# Patient Record
Sex: MALE | Race: WHITE | NOT HISPANIC OR LATINO | Employment: UNEMPLOYED | URBAN - NONMETROPOLITAN AREA
[De-identification: names, ages, dates, MRNs, and addresses within clinical notes are randomized per-mention and may not be internally consistent; named-entity substitution may affect disease eponyms.]

---

## 2023-10-20 ENCOUNTER — APPOINTMENT (OUTPATIENT)
Dept: RADIOLOGY | Facility: HOSPITAL | Age: 63
DRG: 124 | End: 2023-10-20
Payer: MEDICAID

## 2023-10-20 ENCOUNTER — HOSPITAL ENCOUNTER (OUTPATIENT)
Facility: HOSPITAL | Age: 63
Setting detail: OBSERVATION
Discharge: HOME | DRG: 124 | End: 2023-10-23
Attending: EMERGENCY MEDICINE | Admitting: INTERNAL MEDICINE
Payer: MEDICAID

## 2023-10-20 DIAGNOSIS — S09.90XA CLOSED HEAD INJURY, INITIAL ENCOUNTER: ICD-10-CM
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
## 2023-10-20 DIAGNOSIS — S01.81XA FACE LACERATIONS, INITIAL ENCOUNTER: ICD-10-CM

## 2023-10-20 DIAGNOSIS — R29.6 MULTIPLE FALLS: ICD-10-CM

## 2023-10-20 LAB
ANION GAP SERPL CALC-SCNC: 11 MMOL/L (ref 10–20)
APPEARANCE UR: CLEAR
BASOPHILS # BLD AUTO: 0.04 X10*3/UL (ref 0–0.1)
BASOPHILS NFR BLD AUTO: 0.7 %
BILIRUB UR STRIP.AUTO-MCNC: NEGATIVE MG/DL
BUN SERPL-MCNC: 14 MG/DL (ref 6–23)
CALCIUM SERPL-MCNC: 9.6 MG/DL (ref 8.6–10.3)
CHLORIDE SERPL-SCNC: 102 MMOL/L (ref 98–107)
CO2 SERPL-SCNC: 27 MMOL/L (ref 21–32)
COLOR UR: YELLOW
CREAT SERPL-MCNC: 0.58 MG/DL (ref 0.5–1.3)
EOSINOPHIL # BLD AUTO: 0.11 X10*3/UL (ref 0–0.7)
EOSINOPHIL NFR BLD AUTO: 1.9 %
ERYTHROCYTE [DISTWIDTH] IN BLOOD BY AUTOMATED COUNT: 13.2 % (ref 11.5–14.5)
ETHANOL SERPL-MCNC: <10 MG/DL
GFR SERPL CREATININE-BSD FRML MDRD: >90 ML/MIN/1.73M*2
GLUCOSE BLD MANUAL STRIP-MCNC: 105 MG/DL (ref 74–99)
GLUCOSE BLD MANUAL STRIP-MCNC: 170 MG/DL (ref 74–99)
GLUCOSE SERPL-MCNC: 157 MG/DL (ref 74–99)
GLUCOSE UR STRIP.AUTO-MCNC: ABNORMAL MG/DL
HCT VFR BLD AUTO: 44.8 % (ref 41–52)
HGB BLD-MCNC: 15.2 G/DL (ref 13.5–17.5)
HOLD SPECIMEN: NORMAL
IMM GRANULOCYTES # BLD AUTO: 0.02 X10*3/UL (ref 0–0.7)
IMM GRANULOCYTES NFR BLD AUTO: 0.3 % (ref 0–0.9)
KETONES UR STRIP.AUTO-MCNC: ABNORMAL MG/DL
LEUKOCYTE ESTERASE UR QL STRIP.AUTO: NEGATIVE
LYMPHOCYTES # BLD AUTO: 1.12 X10*3/UL (ref 1.2–4.8)
LYMPHOCYTES NFR BLD AUTO: 19.5 %
MCH RBC QN AUTO: 30.4 PG (ref 26–34)
MCHC RBC AUTO-ENTMCNC: 33.9 G/DL (ref 32–36)
MCV RBC AUTO: 90 FL (ref 80–100)
MONOCYTES # BLD AUTO: 0.36 X10*3/UL (ref 0.1–1)
MONOCYTES NFR BLD AUTO: 6.3 %
NEUTROPHILS # BLD AUTO: 4.1 X10*3/UL (ref 1.2–7.7)
NEUTROPHILS NFR BLD AUTO: 71.3 %
NITRITE UR QL STRIP.AUTO: NEGATIVE
NRBC BLD-RTO: 0 /100 WBCS (ref 0–0)
PH UR STRIP.AUTO: 5 [PH]
PLATELET # BLD AUTO: 196 X10*3/UL (ref 150–450)
PMV BLD AUTO: 8.3 FL (ref 7.5–11.5)
POTASSIUM SERPL-SCNC: 3.8 MMOL/L (ref 3.5–5.3)
PROT UR STRIP.AUTO-MCNC: NEGATIVE MG/DL
RBC # BLD AUTO: 5 X10*6/UL (ref 4.5–5.9)
RBC # UR STRIP.AUTO: NEGATIVE /UL
SODIUM SERPL-SCNC: 136 MMOL/L (ref 136–145)
SP GR UR STRIP.AUTO: 1.03
UROBILINOGEN UR STRIP.AUTO-MCNC: <2 MG/DL
WBC # BLD AUTO: 5.8 X10*3/UL (ref 4.4–11.3)

## 2023-10-20 PROCEDURE — 70486 CT MAXILLOFACIAL W/O DYE: CPT | Performed by: RADIOLOGY

## 2023-10-20 PROCEDURE — 96372 THER/PROPH/DIAG INJ SC/IM: CPT | Mod: IPSPLIT | Performed by: INTERNAL MEDICINE

## 2023-10-20 PROCEDURE — 72125 CT NECK SPINE W/O DYE: CPT | Mod: MG

## 2023-10-20 PROCEDURE — 70450 CT HEAD/BRAIN W/O DYE: CPT | Performed by: RADIOLOGY

## 2023-10-20 PROCEDURE — 73564 X-RAY EXAM KNEE 4 OR MORE: CPT | Mod: LT,IPSPLIT

## 2023-10-20 PROCEDURE — 73564 X-RAY EXAM KNEE 4 OR MORE: CPT | Mod: LEFT SIDE | Performed by: RADIOLOGY

## 2023-10-20 PROCEDURE — 96360 HYDRATION IV INFUSION INIT: CPT

## 2023-10-20 PROCEDURE — 73110 X-RAY EXAM OF WRIST: CPT | Mod: RT,FY

## 2023-10-20 PROCEDURE — G0378 HOSPITAL OBSERVATION PER HR: HCPCS

## 2023-10-20 PROCEDURE — 80048 BASIC METABOLIC PNL TOTAL CA: CPT | Performed by: EMERGENCY MEDICINE

## 2023-10-20 PROCEDURE — 2500000001 HC RX 250 WO HCPCS SELF ADMINISTERED DRUGS (ALT 637 FOR MEDICARE OP): Mod: IPSPLIT | Performed by: INTERNAL MEDICINE

## 2023-10-20 PROCEDURE — 70450 CT HEAD/BRAIN W/O DYE: CPT | Mod: MG

## 2023-10-20 PROCEDURE — 1210000001 HC SEMI-PRIVATE ROOM DAILY: Mod: IPSPLIT

## 2023-10-20 PROCEDURE — 76376 3D RENDER W/INTRP POSTPROCES: CPT | Performed by: RADIOLOGY

## 2023-10-20 PROCEDURE — 82947 ASSAY GLUCOSE BLOOD QUANT: CPT | Mod: 59,IPSPLIT

## 2023-10-20 PROCEDURE — 36415 COLL VENOUS BLD VENIPUNCTURE: CPT | Performed by: EMERGENCY MEDICINE

## 2023-10-20 PROCEDURE — 85025 COMPLETE CBC W/AUTO DIFF WBC: CPT | Performed by: EMERGENCY MEDICINE

## 2023-10-20 PROCEDURE — 2500000004 HC RX 250 GENERAL PHARMACY W/ HCPCS (ALT 636 FOR OP/ED): Mod: 59,IPSPLIT | Performed by: INTERNAL MEDICINE

## 2023-10-20 PROCEDURE — 99285 EMERGENCY DEPT VISIT HI MDM: CPT | Performed by: EMERGENCY MEDICINE

## 2023-10-20 PROCEDURE — 81003 URINALYSIS AUTO W/O SCOPE: CPT | Performed by: EMERGENCY MEDICINE

## 2023-10-20 PROCEDURE — 73110 X-RAY EXAM OF WRIST: CPT | Mod: RIGHT SIDE | Performed by: RADIOLOGY

## 2023-10-20 PROCEDURE — 2500000004 HC RX 250 GENERAL PHARMACY W/ HCPCS (ALT 636 FOR OP/ED): Mod: SE | Performed by: EMERGENCY MEDICINE

## 2023-10-20 PROCEDURE — 1100000001 HC PRIVATE ROOM DAILY: Mod: IPSPLIT

## 2023-10-20 PROCEDURE — 82077 ASSAY SPEC XCP UR&BREATH IA: CPT | Performed by: EMERGENCY MEDICINE

## 2023-10-20 PROCEDURE — 72125 CT NECK SPINE W/O DYE: CPT | Performed by: RADIOLOGY

## 2023-10-20 PROCEDURE — 96361 HYDRATE IV INFUSION ADD-ON: CPT

## 2023-10-20 RX ORDER — BUPROPION HYDROCHLORIDE 300 MG/1
300 TABLET ORAL
COMMUNITY
Start: 2021-06-01

## 2023-10-20 RX ORDER — DEXTROSE 50 % IN WATER (D50W) INTRAVENOUS SYRINGE
25
Status: DISCONTINUED | OUTPATIENT
Start: 2023-10-20 | End: 2023-10-23 | Stop reason: HOSPADM

## 2023-10-20 RX ORDER — BUSPIRONE HYDROCHLORIDE 5 MG/1
15 TABLET ORAL 2 TIMES DAILY
Status: DISCONTINUED | OUTPATIENT
Start: 2023-10-20 | End: 2023-10-23 | Stop reason: HOSPADM

## 2023-10-20 RX ORDER — GABAPENTIN 100 MG/1
200 CAPSULE ORAL EVERY 8 HOURS SCHEDULED
Status: DISCONTINUED | OUTPATIENT
Start: 2023-10-20 | End: 2023-10-23 | Stop reason: HOSPADM

## 2023-10-20 RX ORDER — ENOXAPARIN SODIUM 100 MG/ML
40 INJECTION SUBCUTANEOUS EVERY 24 HOURS
Status: DISCONTINUED | OUTPATIENT
Start: 2023-10-20 | End: 2023-10-23 | Stop reason: HOSPADM

## 2023-10-20 RX ORDER — BUSPIRONE HYDROCHLORIDE 15 MG/1
15 TABLET ORAL
COMMUNITY
Start: 2021-06-01

## 2023-10-20 RX ORDER — BUPROPION HYDROCHLORIDE 150 MG/1
300 TABLET ORAL DAILY
Status: DISCONTINUED | OUTPATIENT
Start: 2023-10-20 | End: 2023-10-23 | Stop reason: HOSPADM

## 2023-10-20 RX ORDER — ATORVASTATIN CALCIUM 40 MG/1
40 TABLET, FILM COATED ORAL DAILY
Status: DISCONTINUED | OUTPATIENT
Start: 2023-10-20 | End: 2023-10-23 | Stop reason: HOSPADM

## 2023-10-20 RX ORDER — DEXTROSE MONOHYDRATE 100 MG/ML
0.3 INJECTION, SOLUTION INTRAVENOUS ONCE AS NEEDED
Status: DISCONTINUED | OUTPATIENT
Start: 2023-10-20 | End: 2023-10-23 | Stop reason: HOSPADM

## 2023-10-20 RX ORDER — INSULIN LISPRO 100 [IU]/ML
0-15 INJECTION, SOLUTION INTRAVENOUS; SUBCUTANEOUS
Status: DISCONTINUED | OUTPATIENT
Start: 2023-10-20 | End: 2023-10-23 | Stop reason: HOSPADM

## 2023-10-20 RX ORDER — MIRTAZAPINE 15 MG/1
15 TABLET, FILM COATED ORAL
COMMUNITY
Start: 2021-05-03

## 2023-10-20 RX ORDER — DESVENLAFAXINE 100 MG/1
100 TABLET, EXTENDED RELEASE ORAL
COMMUNITY
Start: 2021-06-01

## 2023-10-20 RX ORDER — BUPROPION HYDROCHLORIDE 150 MG/1
150 TABLET ORAL
COMMUNITY
Start: 2021-06-01

## 2023-10-20 RX ORDER — ATORVASTATIN CALCIUM 40 MG/1
1 TABLET, FILM COATED ORAL DAILY
COMMUNITY
Start: 2023-07-19

## 2023-10-20 RX ORDER — GABAPENTIN 100 MG/1
200 CAPSULE ORAL 2 TIMES DAILY
COMMUNITY
Start: 2023-08-21 | End: 2023-11-19

## 2023-10-20 RX ORDER — LIDOCAINE HYDROCHLORIDE AND EPINEPHRINE 10; 10 MG/ML; UG/ML
INJECTION, SOLUTION INFILTRATION; PERINEURAL
Status: DISPENSED
Start: 2023-10-20 | End: 2023-10-21

## 2023-10-20 RX ORDER — MIRTAZAPINE 15 MG/1
15 TABLET, FILM COATED ORAL NIGHTLY
Status: DISCONTINUED | OUTPATIENT
Start: 2023-10-20 | End: 2023-10-23 | Stop reason: HOSPADM

## 2023-10-20 RX ADMIN — BUPROPION HYDROCHLORIDE 300 MG: 150 TABLET, FILM COATED, EXTENDED RELEASE ORAL at 21:39

## 2023-10-20 RX ADMIN — GABAPENTIN 200 MG: 100 CAPSULE ORAL at 21:38

## 2023-10-20 RX ADMIN — MIRTAZAPINE 15 MG: 15 TABLET, FILM COATED ORAL at 21:38

## 2023-10-20 RX ADMIN — BUSPIRONE HYDROCHLORIDE 15 MG: 5 TABLET ORAL at 21:40

## 2023-10-20 RX ADMIN — SODIUM CHLORIDE, POTASSIUM CHLORIDE, SODIUM LACTATE AND CALCIUM CHLORIDE 500 ML: 600; 310; 30; 20 INJECTION, SOLUTION INTRAVENOUS at 14:37

## 2023-10-20 RX ADMIN — ATORVASTATIN CALCIUM 40 MG: 40 TABLET, FILM COATED ORAL at 21:38

## 2023-10-20 RX ADMIN — ENOXAPARIN SODIUM 40 MG: 100 INJECTION SUBCUTANEOUS at 21:39

## 2023-10-20 SDOH — ECONOMIC STABILITY: INCOME INSECURITY: IN THE LAST 12 MONTHS, WAS THERE A TIME WHEN YOU WERE NOT ABLE TO PAY THE MORTGAGE OR RENT ON TIME?: NO

## 2023-10-20 SDOH — SOCIAL STABILITY: SOCIAL INSECURITY: ARE THERE ANY APPARENT SIGNS OF INJURIES/BEHAVIORS THAT COULD BE RELATED TO ABUSE/NEGLECT?: NO

## 2023-10-20 SDOH — HEALTH STABILITY: MENTAL HEALTH: HOW OFTEN DO YOU HAVE 6 OR MORE DRINKS ON ONE OCCASION?: NEVER

## 2023-10-20 SDOH — HEALTH STABILITY: MENTAL HEALTH: HOW OFTEN DO YOU HAVE A DRINK CONTAINING ALCOHOL?: NEVER

## 2023-10-20 SDOH — ECONOMIC STABILITY: FOOD INSECURITY: WITHIN THE PAST 12 MONTHS, THE FOOD YOU BOUGHT JUST DIDN'T LAST AND YOU DIDN'T HAVE MONEY TO GET MORE.: OFTEN TRUE

## 2023-10-20 SDOH — HEALTH STABILITY: MENTAL HEALTH
STRESS IS WHEN SOMEONE FEELS TENSE, NERVOUS, ANXIOUS, OR CAN'T SLEEP AT NIGHT BECAUSE THEIR MIND IS TROUBLED. HOW STRESSED ARE YOU?: RATHER MUCH

## 2023-10-20 SDOH — ECONOMIC STABILITY: INCOME INSECURITY: IN THE PAST 12 MONTHS, HAS THE ELECTRIC, GAS, OIL, OR WATER COMPANY THREATENED TO SHUT OFF SERVICE IN YOUR HOME?: NO

## 2023-10-20 SDOH — ECONOMIC STABILITY: HOUSING INSECURITY
IN THE LAST 12 MONTHS, WAS THERE A TIME WHEN YOU DID NOT HAVE A STEADY PLACE TO SLEEP OR SLEPT IN A SHELTER (INCLUDING NOW)?: YES

## 2023-10-20 SDOH — SOCIAL STABILITY: SOCIAL INSECURITY: HAVE YOU HAD THOUGHTS OF HARMING ANYONE ELSE?: NO

## 2023-10-20 SDOH — SOCIAL STABILITY: SOCIAL INSECURITY: HAS ANYONE EVER THREATENED TO HURT YOUR FAMILY OR YOUR PETS?: NO

## 2023-10-20 SDOH — ECONOMIC STABILITY: TRANSPORTATION INSECURITY
IN THE PAST 12 MONTHS, HAS THE LACK OF TRANSPORTATION KEPT YOU FROM MEDICAL APPOINTMENTS OR FROM GETTING MEDICATIONS?: YES

## 2023-10-20 SDOH — SOCIAL STABILITY: SOCIAL INSECURITY: ABUSE: ADULT

## 2023-10-20 SDOH — ECONOMIC STABILITY: INCOME INSECURITY: HOW HARD IS IT FOR YOU TO PAY FOR THE VERY BASICS LIKE FOOD, HOUSING, MEDICAL CARE, AND HEATING?: VERY HARD

## 2023-10-20 SDOH — SOCIAL STABILITY: SOCIAL INSECURITY: DO YOU FEEL ANYONE HAS EXPLOITED OR TAKEN ADVANTAGE OF YOU FINANCIALLY OR OF YOUR PERSONAL PROPERTY?: NO

## 2023-10-20 SDOH — SOCIAL STABILITY: SOCIAL INSECURITY: DO YOU FEEL UNSAFE GOING BACK TO THE PLACE WHERE YOU ARE LIVING?: NO

## 2023-10-20 SDOH — ECONOMIC STABILITY: TRANSPORTATION INSECURITY
IN THE PAST 12 MONTHS, HAS LACK OF TRANSPORTATION KEPT YOU FROM MEETINGS, WORK, OR FROM GETTING THINGS NEEDED FOR DAILY LIVING?: YES

## 2023-10-20 SDOH — SOCIAL STABILITY: SOCIAL INSECURITY: ARE YOU OR HAVE YOU BEEN THREATENED OR ABUSED PHYSICALLY, EMOTIONALLY, OR SEXUALLY BY ANYONE?: NO

## 2023-10-20 SDOH — ECONOMIC STABILITY: FOOD INSECURITY: WITHIN THE PAST 12 MONTHS, YOU WORRIED THAT YOUR FOOD WOULD RUN OUT BEFORE YOU GOT MONEY TO BUY MORE.: OFTEN TRUE

## 2023-10-20 SDOH — SOCIAL STABILITY: SOCIAL INSECURITY: WERE YOU ABLE TO COMPLETE ALL THE BEHAVIORAL HEALTH SCREENINGS?: YES

## 2023-10-20 SDOH — HEALTH STABILITY: MENTAL HEALTH: HOW MANY STANDARD DRINKS CONTAINING ALCOHOL DO YOU HAVE ON A TYPICAL DAY?: PATIENT DOES NOT DRINK

## 2023-10-20 SDOH — ECONOMIC STABILITY: HOUSING INSECURITY: IN THE LAST 12 MONTHS, HOW MANY PLACES HAVE YOU LIVED?: 2

## 2023-10-20 SDOH — SOCIAL STABILITY: SOCIAL INSECURITY: DOES ANYONE TRY TO KEEP YOU FROM HAVING/CONTACTING OTHER FRIENDS OR DOING THINGS OUTSIDE YOUR HOME?: NO

## 2023-10-20 ASSESSMENT — COGNITIVE AND FUNCTIONAL STATUS - GENERAL
DAILY ACTIVITIY SCORE: 24
MOBILITY SCORE: 24
PATIENT BASELINE BEDBOUND: NO
MOBILITY SCORE: 24

## 2023-10-20 ASSESSMENT — LIFESTYLE VARIABLES
AUDIT-C TOTAL SCORE: 0
PRESCIPTION_ABUSE_PAST_12_MONTHS: NO
SKIP TO QUESTIONS 9-10: 1
AUDIT-C TOTAL SCORE: 0
HOW MANY STANDARD DRINKS CONTAINING ALCOHOL DO YOU HAVE ON A TYPICAL DAY: PATIENT DOES NOT DRINK
SKIP TO QUESTIONS 9-10: 1
AUDIT-C TOTAL SCORE: 0
HOW OFTEN DO YOU HAVE A DRINK CONTAINING ALCOHOL: NEVER
HOW OFTEN DO YOU HAVE 6 OR MORE DRINKS ON ONE OCCASION: NEVER
SUBSTANCE_ABUSE_PAST_12_MONTHS: NO

## 2023-10-20 ASSESSMENT — COLUMBIA-SUICIDE SEVERITY RATING SCALE - C-SSRS
6. HAVE YOU EVER DONE ANYTHING, STARTED TO DO ANYTHING, OR PREPARED TO DO ANYTHING TO END YOUR LIFE?: NO
2. HAVE YOU ACTUALLY HAD ANY THOUGHTS OF KILLING YOURSELF?: NO
1. IN THE PAST MONTH, HAVE YOU WISHED YOU WERE DEAD OR WISHED YOU COULD GO TO SLEEP AND NOT WAKE UP?: NO

## 2023-10-20 ASSESSMENT — ACTIVITIES OF DAILY LIVING (ADL)
LACK_OF_TRANSPORTATION: YES
ADEQUATE_TO_COMPLETE_ADL: YES
HEARING - LEFT EAR: FUNCTIONAL
PATIENT'S MEMORY ADEQUATE TO SAFELY COMPLETE DAILY ACTIVITIES?: YES
WALKS IN HOME: INDEPENDENT
FEEDING YOURSELF: INDEPENDENT
BATHING: INDEPENDENT
GROOMING: INDEPENDENT
HEARING - RIGHT EAR: FUNCTIONAL
DRESSING YOURSELF: INDEPENDENT
LACK_OF_TRANSPORTATION: YES
TOILETING: INDEPENDENT
JUDGMENT_ADEQUATE_SAFELY_COMPLETE_DAILY_ACTIVITIES: YES

## 2023-10-20 ASSESSMENT — PAIN SCALES - GENERAL
PAINLEVEL_OUTOF10: 10 - WORST POSSIBLE PAIN
PAINLEVEL_OUTOF10: 0 - NO PAIN
PAINLEVEL_OUTOF10: 4

## 2023-10-20 ASSESSMENT — PATIENT HEALTH QUESTIONNAIRE - PHQ9
1. LITTLE INTEREST OR PLEASURE IN DOING THINGS: SEVERAL DAYS
SUM OF ALL RESPONSES TO PHQ9 QUESTIONS 1 & 2: 2
2. FEELING DOWN, DEPRESSED OR HOPELESS: SEVERAL DAYS

## 2023-10-20 ASSESSMENT — PAIN DESCRIPTION - ORIENTATION: ORIENTATION: LEFT

## 2023-10-20 ASSESSMENT — PAIN - FUNCTIONAL ASSESSMENT: PAIN_FUNCTIONAL_ASSESSMENT: 0-10

## 2023-10-20 ASSESSMENT — PAIN DESCRIPTION - LOCATION: LOCATION: HEAD

## 2023-10-20 NOTE — ED PROVIDER NOTES
HPI   Chief Complaint   Patient presents with    Fall     Walking across street and caught feet on lip in concrete. Has laceration above rt eye, pain in lt knee and rt wrist.       HPI                    Radha Coma Scale Score: 15                  Patient History   History reviewed. No pertinent past medical history.  History reviewed. No pertinent surgical history.  No family history on file.  Social History     Tobacco Use    Smoking status: Former     Types: Cigarettes    Smokeless tobacco: Former   Substance Use Topics    Alcohol use: Never    Drug use: Never       Physical Exam   ED Triage Vitals [10/20/23 1215]   Temp Heart Rate Resp BP   -- 90 18 (!) 156/112      SpO2 Temp src Heart Rate Source Patient Position   -- -- -- --      BP Location FiO2 (%)     -- --       Physical Exam  Vitals and nursing note reviewed.   Constitutional:       General: He is not in acute distress.     Appearance: Normal appearance. He is not toxic-appearing.   HENT:      Head: Normocephalic and atraumatic.        Right Ear: Tympanic membrane normal.      Left Ear: Tympanic membrane normal.      Mouth/Throat:      Mouth: Mucous membranes are moist.      Pharynx: Oropharynx is clear.   Eyes:      Conjunctiva/sclera: Conjunctivae normal.      Pupils: Pupils are equal, round, and reactive to light.   Cardiovascular:      Rate and Rhythm: Normal rate and regular rhythm.      Pulses: Normal pulses.      Heart sounds: Normal heart sounds.   Pulmonary:      Effort: Pulmonary effort is normal. No respiratory distress.      Breath sounds: Normal breath sounds. No wheezing.   Abdominal:      General: Bowel sounds are normal.      Palpations: Abdomen is soft.      Tenderness: There is no abdominal tenderness. There is no guarding or rebound.   Musculoskeletal:         General: Normal range of motion.      Cervical back: Normal range of motion.   Skin:     General: Skin is warm and dry.   Neurological:      General: No focal deficit present.       Mental Status: He is alert and oriented to person, place, and time.         ED Course & MDM   Diagnoses as of 10/20/23 1559   Fall, initial encounter   Closed head injury, initial encounter   Face lacerations, initial encounter   Multiple falls       Medical Decision Making  63-year-old male presents to the ER with increased falls.  According to patient he has been falling a lot lately secondary to his legs not working well.  Patient reports he does use a cane but still is not enough for him.  Patient did fall today and has a large laceration of his left eyebrow.  Patient does not want it repaired he wanted to heal by secondary intention.  Patient also complains of left knee and right wrist pain.  X-ray shows remote fracture on the right wrist.  Patient reported he has been having pain there for a long time we will place a Velcro splint.  Patient is homeless we consulted  who recommended admitting him for placement.  Discussed with inpatient team patient be admitted to the hospital for evaluation and placement for possible rehab.        Procedure  Procedures     Preston Aparicio, DO  10/20/23 5620

## 2023-10-20 NOTE — CARE PLAN
The patient's goals for the shift include control pain    The clinical goals for the shift include control pain, from laceration and left knee

## 2023-10-21 LAB
GLUCOSE BLD MANUAL STRIP-MCNC: 133 MG/DL (ref 74–99)
GLUCOSE BLD MANUAL STRIP-MCNC: 186 MG/DL (ref 74–99)
GLUCOSE BLD MANUAL STRIP-MCNC: 195 MG/DL (ref 74–99)
GLUCOSE BLD MANUAL STRIP-MCNC: 213 MG/DL (ref 74–99)

## 2023-10-21 PROCEDURE — G0378 HOSPITAL OBSERVATION PER HR: HCPCS

## 2023-10-21 PROCEDURE — 2500000001 HC RX 250 WO HCPCS SELF ADMINISTERED DRUGS (ALT 637 FOR MEDICARE OP): Mod: IPSPLIT | Performed by: INTERNAL MEDICINE

## 2023-10-21 PROCEDURE — 1210000001 HC SEMI-PRIVATE ROOM DAILY: Mod: IPSPLIT

## 2023-10-21 PROCEDURE — 99222 1ST HOSP IP/OBS MODERATE 55: CPT | Performed by: INTERNAL MEDICINE

## 2023-10-21 PROCEDURE — 96372 THER/PROPH/DIAG INJ SC/IM: CPT | Mod: IPSPLIT | Performed by: INTERNAL MEDICINE

## 2023-10-21 PROCEDURE — 2500000002 HC RX 250 W HCPCS SELF ADMINISTERED DRUGS (ALT 637 FOR MEDICARE OP, ALT 636 FOR OP/ED): Mod: IPSPLIT | Performed by: INTERNAL MEDICINE

## 2023-10-21 PROCEDURE — 2500000004 HC RX 250 GENERAL PHARMACY W/ HCPCS (ALT 636 FOR OP/ED): Mod: IPSPLIT | Performed by: INTERNAL MEDICINE

## 2023-10-21 PROCEDURE — 82947 ASSAY GLUCOSE BLOOD QUANT: CPT | Mod: IPSPLIT,91

## 2023-10-21 PROCEDURE — 1100000001 HC PRIVATE ROOM DAILY: Mod: IPSPLIT

## 2023-10-21 RX ORDER — ACETAMINOPHEN 325 MG/1
650 TABLET ORAL EVERY 6 HOURS PRN
Status: DISCONTINUED | OUTPATIENT
Start: 2023-10-21 | End: 2023-10-23 | Stop reason: HOSPADM

## 2023-10-21 RX ADMIN — ACETAMINOPHEN 650 MG: 325 TABLET ORAL at 20:55

## 2023-10-21 RX ADMIN — INSULIN LISPRO 6 UNITS: 100 INJECTION, SOLUTION INTRAVENOUS; SUBCUTANEOUS at 17:13

## 2023-10-21 RX ADMIN — ATORVASTATIN CALCIUM 40 MG: 40 TABLET, FILM COATED ORAL at 09:40

## 2023-10-21 RX ADMIN — GABAPENTIN 200 MG: 100 CAPSULE ORAL at 21:00

## 2023-10-21 RX ADMIN — GABAPENTIN 200 MG: 100 CAPSULE ORAL at 14:17

## 2023-10-21 RX ADMIN — MIRTAZAPINE 15 MG: 15 TABLET, FILM COATED ORAL at 20:56

## 2023-10-21 RX ADMIN — BUSPIRONE HYDROCHLORIDE 15 MG: 5 TABLET ORAL at 09:40

## 2023-10-21 RX ADMIN — BUPROPION HYDROCHLORIDE 300 MG: 150 TABLET, FILM COATED, EXTENDED RELEASE ORAL at 09:40

## 2023-10-21 RX ADMIN — GABAPENTIN 200 MG: 100 CAPSULE ORAL at 05:38

## 2023-10-21 RX ADMIN — EMPAGLIFLOZIN 25 MG: 10 TABLET, FILM COATED ORAL at 09:40

## 2023-10-21 RX ADMIN — ENOXAPARIN SODIUM 40 MG: 100 INJECTION SUBCUTANEOUS at 18:28

## 2023-10-21 RX ADMIN — BUSPIRONE HYDROCHLORIDE 15 MG: 5 TABLET ORAL at 20:58

## 2023-10-21 RX ADMIN — INSULIN LISPRO 3 UNITS: 100 INJECTION, SOLUTION INTRAVENOUS; SUBCUTANEOUS at 12:10

## 2023-10-21 ASSESSMENT — COGNITIVE AND FUNCTIONAL STATUS - GENERAL
MOBILITY SCORE: 24
DAILY ACTIVITIY SCORE: 24
DAILY ACTIVITIY SCORE: 24
MOBILITY SCORE: 24

## 2023-10-21 ASSESSMENT — PAIN SCALES - GENERAL
PAINLEVEL_OUTOF10: 0 - NO PAIN
PAINLEVEL_OUTOF10: 3

## 2023-10-21 ASSESSMENT — PAIN - FUNCTIONAL ASSESSMENT
PAIN_FUNCTIONAL_ASSESSMENT: 0-10
PAIN_FUNCTIONAL_ASSESSMENT: 0-10

## 2023-10-21 NOTE — CARE PLAN
The patient's goals for the shift include control pain    The clinical goals for the shift include control pain, from laceration and left knee    Over the shift, the patient did not make progress toward the following goals. Barriers to progression include ***. Recommendations to address these barriers include ***.    Problem: Pain - Adult  Goal: Verbalizes/displays adequate comfort level or baseline comfort level  Outcome: Not Progressing     Problem: Safety - Adult  Goal: Free from fall injury  Outcome: Not Progressing     Problem: Discharge Planning  Goal: Discharge to home or other facility with appropriate resources  Outcome: Not Progressing     Problem: Fall/Injury  Goal: Not fall by end of shift  Outcome: Not Progressing  Goal: Be free from injury by end of the shift  Outcome: Not Progressing  Goal: Verbalize understanding of personal risk factors for fall in the hospital  Outcome: Not Progressing  Goal: Verbalize understanding of risk factor reduction measures to prevent injury from fall in the home  Outcome: Not Progressing  Goal: Use assistive devices by end of the shift  Outcome: Not Progressing  Goal: Pace activities to prevent fatigue by end of the shift  Outcome: Not Progressing     Problem: Pain  Goal: Takes deep breaths with improved pain control throughout the shift  Outcome: Not Progressing  Goal: Turns in bed with improved pain control throughout the shift  Outcome: Not Progressing  Goal: Walks with improved pain control throughout the shift  Outcome: Not Progressing  Goal: Performs ADL's with improved pain control throughout shift  Outcome: Not Progressing  Goal: Participates in PT with improved pain control throughout the shift  Outcome: Not Progressing  Goal: Free from opioid side effects throughout the shift  Outcome: Not Progressing  Goal: Free from acute confusion related to pain meds throughout the shift  Outcome: Not Progressing

## 2023-10-21 NOTE — H&P
10/21/2023  Vaibhav Dick  : 1960  MRN: [id number]  Primary Care:  Mildred Borjas MD  Old Records:  reviewed  Code Status: Full Code    Informant: the patient and the  medical record. There are no family here.  The informant is reliable.    Chief Complaint: mechanical fall and homelessness.    History of Present Illness:History of Present Illness: Vaibhav Dick is a 63 y.o. male, who presents with a recent mechanical fall after having caught his foot on the lip of a sidewalk, and then falling and hitting his head.  CT scan of head and neck did not show fracture or bleed.  Patient is homeless and walks with a cane.  He states that he falls quite frequently.  His eating is sporadic.  He is admitted for possible placement in NH temporarily.     In the Emergency Department, the patient was afebrile and hemodynamically stable. His diagnostic studies and laboratories are summarized below. I saw the patient shortly after arriving on the nursing unit.     Past Medical History:  History reviewed. No pertinent past medical history.    Past Surgical History:   has no past surgical history on file.        Social History: Vaibhav Dick lives  is homeless Completes all ADLs with assistance.    Social History     Socioeconomic History   • Marital status:      Spouse name: None   • Number of children: None   • Years of education: None   • Highest education level: None   Occupational History   • None   Tobacco Use   • Smoking status: Former     Types: Cigarettes   • Smokeless tobacco: Former   Substance and Sexual Activity   • Alcohol use: Never   • Drug use: Never   • Sexual activity: None   Other Topics Concern   • None   Social History Narrative   • None     Social Determinants of Health     Financial Resource Strain: High Risk (10/20/2023)    Overall Financial Resource Strain (CARDIA)    • Difficulty of Paying Living Expenses: Very hard   Food Insecurity: Food Insecurity Present (10/20/2023)    Hunger Vital Sign     • Worried About Running Out of Food in the Last Year: Often true    • Ran Out of Food in the Last Year: Often true   Transportation Needs: Unmet Transportation Needs (10/20/2023)    PRAPARE - Transportation    • Lack of Transportation (Medical): Yes    • Lack of Transportation (Non-Medical): Yes   Physical Activity: Not on file   Stress: Stress Concern Present (10/20/2023)    [employer] - Occupational Stress Questionnaire    • Feeling of Stress : Rather much   Social Connections: Not on file   Intimate Partner Violence: Not on file   Housing Stability: High Risk (10/20/2023)    Housing Stability Vital Sign    • Unable to Pay for Housing in the Last Year: No    • Number of Places Lived in the Last Year: 2    • Unstable Housing in the Last Year: Yes       Allergies  No Known Allergies    Medications - list of home medications reviewed in electronic chart and confirmed  Medications Prior to Admission   Medication Sig Dispense Refill Last Dose   • atorvastatin (Lipitor) 40 mg tablet Take 1 tablet (40 mg) by mouth once daily.      • buPROPion XL (Wellbutrin XL) 150 mg 24 hr tablet Take 1 tablet (150 mg) by mouth.      • buPROPion XL (Wellbutrin XL) 300 mg 24 hr tablet Take 1 tablet (300 mg) by mouth.      • busPIRone (Buspar) 15 mg tablet Take 1 tablet (15 mg) by mouth.      • desvenlafaxine 100 mg 24 hr tablet Take 1 tablet (100 mg) by mouth.      • empagliflozin (Jardiance) 25 mg Take 1 tablet (25 mg) by mouth once daily with a meal.      • gabapentin (Neurontin) 100 mg capsule Take 2 capsules (200 mg) by mouth twice a day.      • mirtazapine (Remeron) 15 mg tablet Take 1 tablet (15 mg) by mouth.          Hospital Medications:    Current Facility-Administered Medications:   •  atorvastatin (Lipitor) tablet 40 mg, 40 mg, oral, Daily, Dustin Antonio MD, 40 mg at 10/20/23 2138  •  buPROPion XL (Wellbutrin XL) 24 hr tablet 300 mg, 300 mg, oral, Daily, Dustin Antonio MD, 300 mg at 10/20/23  2139  •  busPIRone (Buspar) tablet 15 mg, 15 mg, oral, BID, Dustin Antonio MD, 15 mg at 10/20/23 2140  •  dextrose 10 % in water (D10W) infusion, 0.3 g/kg/hr, intravenous, Once PRN, Dustin Antonio MD  •  dextrose 50 % injection 25 g, 25 g, intravenous, q15 min PRN, Dustin Antonio MD  •  empagliflozin (Jardiance) tablet 25 mg, 25 mg, oral, Daily with breakfast, Dustin Antonio MD  •  enoxaparin (Lovenox) syringe 40 mg, 40 mg, subcutaneous, q24h, Dustin Antonio MD, 40 mg at 10/20/23 2139  •  gabapentin (Neurontin) capsule 200 mg, 200 mg, oral, q8h ZULEMA, Dustin Antonio MD, 200 mg at 10/21/23 0538  •  glucagon (Glucagen) injection 1 mg, 1 mg, intramuscular, q15 min PRN, Dustin Antonio MD  •  insulin lispro (HumaLOG) injection 0-15 Units, 0-15 Units, subcutaneous, TID with meals, Dustin Antonio MD  •  mirtazapine (Remeron) tablet 15 mg, 15 mg, oral, Nightly, Dustin Antonio MD, 15 mg at 10/20/23 2138    ROS: As noted in HPI, the remainder of a 14-system review was completed, and negative or as follows:  Constitutional: No fever, chills, weight loss, night sweats  Eyes: No visual changes, difficulties  Ears, Nose, Mouth, Throat: No Tender gums, dry mouth  Cardiovascular: No palpitations, edema, chest pain  Respiratory: No Wheezing, shortness of breath, orthopnea  Gastrointestinal: No Nausea, abdominal pain, vomiting  Genitourinary: No frequent urination, hematuria  Musculoskeletal: No joint pain, swollen joint, difficulty walking  Integumentary: No skin irritation, blemishes, redness in skin  Neurological: No Loss of consciousness, seizures, numbness  Psychiatric: No anxiety, sadness  Endocrine: No Polydipsia, hot or cold intolerance  Hematologic/Lymphatic: No tender lymph nodes, easy bruising  Allergic/Immunologic: No seasonal allergies with itchy eyes, frequent infections    PHYSICAL EXAM   /72 (BP Location: Right arm, Patient Position: Lying)   Pulse 66   Temp 36.6 °C (97.9 °F) (Temporal)   Resp 18   Ht  "1.828 m (5' 11.97\")   Wt 87.4 kg (192 lb 10.9 oz)   SpO2 94%   BMI 26.15 kg/m²   General Appearance:   Alert, cooperative, in no distress, appears stated age   Head:   Normocephalic, atraumatic; Pupils: equally round, and reactive to light and accommodation, meiotic, Extraocular movements are intact without nystagmus; conjunctivae and sclerae are clear; good dentition,  oral mucous membrane are dry.   Neck:  Supple, no meningismus; the trachea is midline, there is no adenopathy; thyroid without nodules; Carotid upstrokes are normal without bruit; there is no jugular venous distension   Back:    No costovertebral angle tenderness or midline point tenderness,    Lungs:    Clear to auscultation bilaterally without rales, rhonchi or wheeze, respirations are unlabored   Chest Wall:   No chest wall tenderness, no dullness to percussion     Heart:   Regular rate and rhythm, normal S1 and S2, no murmur, gallop or rub; PMI normally positioned,    Abdomen:    Soft, non-tender, bowel sounds active in all four quadrants, without mass or organomegaly; no aortic or renal bruit,     Genitalia:   Deferred   Rectal:   Deferred   Extremities:  No clubbing, cyanosis or edema, Pedal pulses are palpable   Skin:  No rashes or lesions, tattoos are noted   Neurologic:  Alert and oriented, CNII-XII intact , symmetric strength, sensation is normal to light touch, Plantar reflex is downgoing bilaterally, speech is fluent       LINES   [unfilled]    FLUID BALANCE     Intake/Output Summary (Last 24 hours) at 10/21/2023 0855  Last data filed at 10/20/2023 2100  Gross per 24 hour   Intake 740 ml   Output --   Net 740 ml       LABS   Diagnostic tests reviewed:   I have personally reviewed available hospital laboratory and diagnostic studies through 10/21/2023. Pertinent study results include: ?    Results for orders placed or performed during the hospital encounter of 10/20/23 (from the past 24 hour(s))   CBC and Auto Differential   Result " Value Ref Range    WBC 5.8 4.4 - 11.3 x10*3/uL    nRBC 0.0 0.0 - 0.0 /100 WBCs    RBC 5.00 4.50 - 5.90 x10*6/uL    Hemoglobin 15.2 13.5 - 17.5 g/dL    Hematocrit 44.8 41.0 - 52.0 %    MCV 90 80 - 100 fL    MCH 30.4 26.0 - 34.0 pg    MCHC 33.9 32.0 - 36.0 g/dL    RDW 13.2 11.5 - 14.5 %    Platelets 196 150 - 450 x10*3/uL    MPV 8.3 7.5 - 11.5 fL    Neutrophils % 71.3 40.0 - 80.0 %    Immature Granulocytes %, Automated 0.3 0.0 - 0.9 %    Lymphocytes % 19.5 13.0 - 44.0 %    Monocytes % 6.3 2.0 - 10.0 %    Eosinophils % 1.9 0.0 - 6.0 %    Basophils % 0.7 0.0 - 2.0 %    Neutrophils Absolute 4.10 1.20 - 7.70 x10*3/uL    Immature Granulocytes Absolute, Automated 0.02 0.00 - 0.70 x10*3/uL    Lymphocytes Absolute 1.12 (L) 1.20 - 4.80 x10*3/uL    Monocytes Absolute 0.36 0.10 - 1.00 x10*3/uL    Eosinophils Absolute 0.11 0.00 - 0.70 x10*3/uL    Basophils Absolute 0.04 0.00 - 0.10 x10*3/uL   Basic metabolic panel   Result Value Ref Range    Glucose 157 (H) 74 - 99 mg/dL    Sodium 136 136 - 145 mmol/L    Potassium 3.8 3.5 - 5.3 mmol/L    Chloride 102 98 - 107 mmol/L    Bicarbonate 27 21 - 32 mmol/L    Anion Gap 11 10 - 20 mmol/L    Urea Nitrogen 14 6 - 23 mg/dL    Creatinine 0.58 0.50 - 1.30 mg/dL    eGFR >90 >60 mL/min/1.73m*2    Calcium 9.6 8.6 - 10.3 mg/dL   Ethanol   Result Value Ref Range    Alcohol <10 <=10 mg/dL   Urinalysis with Reflex Microscopic and Culture   Result Value Ref Range    Color, Urine Yellow Straw, Yellow    Appearance, Urine Clear Clear    Specific Gravity, Urine 1.033 1.005 - 1.035    pH, Urine 5.0 5.0, 5.5, 6.0, 6.5, 7.0, 7.5, 8.0    Protein, Urine NEGATIVE NEGATIVE mg/dL    Glucose, Urine >=500 (3+) (A) NEGATIVE mg/dL    Blood, Urine NEGATIVE NEGATIVE    Ketones, Urine 5 (TRACE) (A) NEGATIVE mg/dL    Bilirubin, Urine NEGATIVE NEGATIVE    Urobilinogen, Urine <2.0 <2.0 mg/dL    Nitrite, Urine NEGATIVE NEGATIVE    Leukocyte Esterase, Urine NEGATIVE NEGATIVE   Light Blue Top   Result Value Ref Range     Extra Tube Hold for add-ons.    Red Top   Result Value Ref Range    Extra Tube Hold for add-ons.    Gray Top   Result Value Ref Range    Extra Tube Hold for add-ons.    Extra Urine Gray Tube   Result Value Ref Range    Extra Tube Hold for add-ons.    POCT GLUCOSE   Result Value Ref Range    POCT Glucose 105 (H) 74 - 99 mg/dL   POCT GLUCOSE   Result Value Ref Range    POCT Glucose 170 (H) 74 - 99 mg/dL   POCT GLUCOSE   Result Value Ref Range    POCT Glucose 133 (H) 74 - 99 mg/dL        IMAGING   XR wrist right 3+ views    Result Date: 10/20/2023  Interpreted By:  Warren Goldman, STUDY: XR WRIST RIGHT 3+ VIEWS; ;  10/20/2023 1:02 pm   INDICATION: Signs/Symptoms:Pain.   COMPARISON: None.   ACCESSION NUMBER(S): [id number]   ORDERING CLINICIAN: BILL DELONG   FINDINGS: Soft tissue swelling. No acute displaced fracture seen. Chronic appearing ossicle adjacent to the ulnar styloid. Chronic appearing ossicle along the dorsum of the wrist suggestive of remote triquetral fracture.       Findings suggestive of remote injury to the triquetrum and ulnar styloid region. If the patient has acute focal point tenderness in these regions, CT may be obtained for further assessment and exclusion underlying acute injury.     MACRO: None   Signed by: Warren Goldman 10/20/2023 1:27 PM Dictation workstation:   [id number]    XR knee left 4+ views    Result Date: 10/20/2023  Interpreted By:  Warren Goldman, STUDY: XR KNEE LEFT 4+ VIEWS; ;  10/20/2023 1:02 pm   INDICATION: Signs/Symptoms:Fall, pain.   COMPARISON: None.   ACCESSION NUMBER(S): [id number]   ORDERING CLINICIAN: BILL DELONG   FINDINGS: No acute fracture. Degenerative changes of the knee, greatest within the medial femorotibial and patellofemoral compartment. Trace joint effusion. Soft tissue swelling.       No acute osseous abnormality left knee.     MACRO: None   Signed by: Warren Goldman 10/20/2023 1:21 PM Dictation workstation:   [id number]    CT cervical spine wo IV  contrast    Result Date: 10/20/2023  Interpreted By:  Warren Goldman, STUDY: CT CERVICAL SPINE WO IV CONTRAST;  10/20/2023 12:44 pm   INDICATION: Signs/Symptoms:Fall, pain.   COMPARISON: None.   ACCESSION NUMBER(S): [id number]   ORDERING CLINICIAN: BILL DELONG   TECHNIQUE: Axial CT images of the cervical spine are obtained. Axial, coronal and sagittal reconstructions are provided for review.   FINDINGS:     Fractures: There is no evidence for an acute fracture of the cervical spine.   Vertebral Alignment: No posttraumatic malalignment.   Craniocervical Junction: The odontoid process and craniocervical junction are intact.   Vertebrae/Disc Spaces:  Multilevel disc space narrowing. Multilevel facet arthropathy Multilevel uncovertebral hypertrophy.Multilevel osteophyte formation.   Prevertebral/Paraspinal Soft Tissues: The prevertebral and paraspinal soft tissues are unremarkable.         Multilevel spondylosis without acute osseous abnormality of the cervical spine.   MACRO: None   Signed by: Warren Goldman 10/20/2023 1:19 PM Dictation workstation:   [id number]    CT head wo IV contrast    Result Date: 10/20/2023  Interpreted By:  Warren Goldman, STUDY: CT HEAD WO IV CONTRAST;  10/20/2023 12:44 pm   INDICATION: Signs/Symptoms:Fall, pain.   COMPARISON: None.   ACCESSION NUMBER(S): [id number]   ORDERING CLINICIAN: BILL DELONG   TECHNIQUE: Noncontrast volumetric CT scan of head and facial bones was performed. Angled reformats in brain and bone windows were generated. The images were reviewed in bone, brain, blood and soft tissue windows. Three-dimensional reformations were processed of the facial bones on a separate workstation.   FINDINGS: CSF Spaces: The ventricles, sulci and basal cisterns are within normal limits. There is no extraaxial fluid collection.   Parenchyma: Mild periventricular and subcortical white matter hypoattenuation, nonspecific, however likely reflecting chronic microvascular ischemic  changes. The grey-white differentiation is intact. There is no mass effect or midline shift.  There is no intracranial hemorrhage.   Calvarium: No acute displaced calvarial fracture.   Facial bones, paranasal sinuses and mastoids: Bilateral ethmoid sinus opacification. Mastoid air cells appear clear.       No CT evidence of acute intracranial pathology     MACRO: None   Signed by: Warren Goldman 10/20/2023 1:16 PM Dictation workstation:   [id number]      A/P     A?P    1) frequent falls-  patient is homeless and would benefit from rehab    2) anorexia/severe protein calrorie malnutrition; food insecurity is an issue here.  Await PT and SW evaluation.  Patient is insured.      3) DMII- continue current management    4) Major depressive disorder.  Continue Wellbutrin.

## 2023-10-21 NOTE — CARE PLAN
The patient's goals for the shift include control pain    The clinical goals for the shift include control pain, from laceration and left knee    Over the shift, the patient did not make progress toward the following goals. Barriers to progression include Recommendations to address these barriers include     Problem: Pain - Adult  Goal: Verbalizes/displays adequate comfort level or baseline comfort level  10/20/2023 2256 by Salazar Umana RN  Outcome: Progressing  10/20/2023 2256 by Salazar Umana RN  Outcome: Not Progressing     Problem: Safety - Adult  Goal: Free from fall injury  10/20/2023 2256 by Salazar Umana RN  Outcome: Progressing  10/20/2023 2256 by Salazar Umana RN  Outcome: Not Progressing     Problem: Discharge Planning  Goal: Discharge to home or other facility with appropriate resources  10/20/2023 2256 by Salazar Umana RN  Outcome: Progressing  10/20/2023 2256 by Salazar Umana RN  Outcome: Not Progressing     Problem: Fall/Injury  Goal: Not fall by end of shift  10/20/2023 2256 by Salazar Umana RN  Outcome: Progressing  10/20/2023 2256 by Salazar Umana RN  Outcome: Not Progressing  Goal: Be free from injury by end of the shift  10/20/2023 2256 by Salazar Umana RN  Outcome: Progressing  10/20/2023 2256 by Salazar Umana RN  Outcome: Not Progressing  Goal: Verbalize understanding of personal risk factors for fall in the hospital  10/20/2023 2256 by Salazar Umana RN  Outcome: Progressing  10/20/2023 2256 by Salazar Umana RN  Outcome: Not Progressing  Goal: Verbalize understanding of risk factor reduction measures to prevent injury from fall in the home  10/20/2023 2256 by Salazar Umana RN  Outcome: Progressing  10/20/2023 2256 by Salazar Umana RN  Outcome: Not Progressing  Goal: Use assistive devices by end of the shift  10/20/2023 2256 by Salazar Umana RN  Outcome: Progressing  10/20/2023 2256 by Salazar Umana RN  Outcome: Not Progressing  Goal: Pace activities to prevent fatigue by end of the shift  10/20/2023 2256 by  Salazar Duris, RN  Outcome: Progressing  10/20/2023 2256 by Salazar Umana RN  Outcome: Not Progressing     Problem: Pain  Goal: Takes deep breaths with improved pain control throughout the shift  10/20/2023 2256 by Salazar Umana RN  Outcome: Progressing  10/20/2023 2256 by Salazar Umana RN  Outcome: Not Progressing  Goal: Turns in bed with improved pain control throughout the shift  10/20/2023 2256 by Salazar Umana RN  Outcome: Progressing  10/20/2023 2256 by Salazar Umana RN  Outcome: Not Progressing  Goal: Walks with improved pain control throughout the shift  10/20/2023 2256 by Salazar mUana RN  Outcome: Progressing  10/20/2023 2256 by Salazar Umana RN  Outcome: Not Progressing  Goal: Performs ADL's with improved pain control throughout shift  10/20/2023 2256 by Salazar Umana RN  Outcome: Progressing  10/20/2023 2256 by Salazar Umana RN  Outcome: Not Progressing  Goal: Participates in PT with improved pain control throughout the shift  10/20/2023 2256 by Salazar Umana RN  Outcome: Progressing  10/20/2023 2256 by Salazar Umana RN  Outcome: Not Progressing  Goal: Free from opioid side effects throughout the shift  10/20/2023 2256 by Salazar Umana RN  Outcome: Progressing  10/20/2023 2256 by Salazar Umana RN  Outcome: Not Progressing  Goal: Free from acute confusion related to pain meds throughout the shift  10/20/2023 2256 by Salazar Umana RN  Outcome: Progressing  10/20/2023 2256 by Salazar Umana RN  Outcome: Not Progressing

## 2023-10-22 LAB
GLUCOSE BLD MANUAL STRIP-MCNC: 130 MG/DL (ref 74–99)
GLUCOSE BLD MANUAL STRIP-MCNC: 161 MG/DL (ref 74–99)
GLUCOSE BLD MANUAL STRIP-MCNC: 166 MG/DL (ref 74–99)
GLUCOSE BLD MANUAL STRIP-MCNC: 174 MG/DL (ref 74–99)

## 2023-10-22 PROCEDURE — 2500000001 HC RX 250 WO HCPCS SELF ADMINISTERED DRUGS (ALT 637 FOR MEDICARE OP): Mod: IPSPLIT | Performed by: INTERNAL MEDICINE

## 2023-10-22 PROCEDURE — G0378 HOSPITAL OBSERVATION PER HR: HCPCS

## 2023-10-22 PROCEDURE — 82947 ASSAY GLUCOSE BLOOD QUANT: CPT | Mod: IPSPLIT,91

## 2023-10-22 PROCEDURE — 1210000001 HC SEMI-PRIVATE ROOM DAILY: Mod: IPSPLIT

## 2023-10-22 PROCEDURE — 94760 N-INVAS EAR/PLS OXIMETRY 1: CPT | Mod: IPSPLIT

## 2023-10-22 PROCEDURE — 2500000004 HC RX 250 GENERAL PHARMACY W/ HCPCS (ALT 636 FOR OP/ED): Mod: IPSPLIT | Performed by: INTERNAL MEDICINE

## 2023-10-22 PROCEDURE — 99231 SBSQ HOSP IP/OBS SF/LOW 25: CPT | Performed by: INTERNAL MEDICINE

## 2023-10-22 PROCEDURE — 96372 THER/PROPH/DIAG INJ SC/IM: CPT | Mod: IPSPLIT | Performed by: INTERNAL MEDICINE

## 2023-10-22 PROCEDURE — 1100000001 HC PRIVATE ROOM DAILY: Mod: IPSPLIT

## 2023-10-22 RX ADMIN — GABAPENTIN 200 MG: 100 CAPSULE ORAL at 16:08

## 2023-10-22 RX ADMIN — GABAPENTIN 200 MG: 100 CAPSULE ORAL at 06:14

## 2023-10-22 RX ADMIN — BUPROPION HYDROCHLORIDE 300 MG: 150 TABLET, FILM COATED, EXTENDED RELEASE ORAL at 10:29

## 2023-10-22 RX ADMIN — ENOXAPARIN SODIUM 40 MG: 100 INJECTION SUBCUTANEOUS at 16:08

## 2023-10-22 RX ADMIN — GABAPENTIN 200 MG: 100 CAPSULE ORAL at 22:00

## 2023-10-22 RX ADMIN — ACETAMINOPHEN 650 MG: 325 TABLET ORAL at 08:43

## 2023-10-22 RX ADMIN — EMPAGLIFLOZIN 25 MG: 10 TABLET, FILM COATED ORAL at 08:43

## 2023-10-22 RX ADMIN — MIRTAZAPINE 15 MG: 15 TABLET, FILM COATED ORAL at 20:28

## 2023-10-22 RX ADMIN — ATORVASTATIN CALCIUM 40 MG: 40 TABLET, FILM COATED ORAL at 08:43

## 2023-10-22 RX ADMIN — BUSPIRONE HYDROCHLORIDE 15 MG: 5 TABLET ORAL at 20:28

## 2023-10-22 RX ADMIN — INSULIN LISPRO 3 UNITS: 100 INJECTION, SOLUTION INTRAVENOUS; SUBCUTANEOUS at 11:58

## 2023-10-22 RX ADMIN — BUSPIRONE HYDROCHLORIDE 15 MG: 5 TABLET ORAL at 08:43

## 2023-10-22 ASSESSMENT — PAIN - FUNCTIONAL ASSESSMENT: PAIN_FUNCTIONAL_ASSESSMENT: 0-10

## 2023-10-22 ASSESSMENT — PAIN SCALES - GENERAL
PAINLEVEL_OUTOF10: 0 - NO PAIN
PAINLEVEL_OUTOF10: 4
PAINLEVEL_OUTOF10: 0 - NO PAIN

## 2023-10-22 NOTE — CARE PLAN
The patient's goals for the shift include control pain    The clinical goals for the shift include Pt will remain afebrile this shift    Over the shift, the patient did not make progress toward the following goals. Barriers to progression include cognitive impairment. Recommendations to address these barriers include monitoring.

## 2023-10-22 NOTE — PROGRESS NOTES
"Vaibhav Dick is a 63 y.o. male on day 2 of admission presenting with Fall, initial encounter.    Subjective   No changes overnight.  Nursing noted cognitive deficit, but did not specify.  No medical issues.  PT still to see.         Objective     Physical Exam  Constitutional:       Appearance: Normal appearance.   HENT:      Head: Normocephalic.      Comments: Repaired laceration from fall over left eye     Nose: Nose normal.      Mouth/Throat:      Mouth: Mucous membranes are dry.   Eyes:      Extraocular Movements: Extraocular movements intact.      Pupils: Pupils are equal, round, and reactive to light.   Cardiovascular:      Rate and Rhythm: Bradycardia present. Rhythm irregular.      Pulses: Normal pulses.      Heart sounds: Normal heart sounds. No murmur heard.     No friction rub. No gallop.   Pulmonary:      Effort: No respiratory distress.      Breath sounds: Normal breath sounds. No stridor. No wheezing, rhonchi or rales.   Chest:      Chest wall: No tenderness.   Abdominal:      General: Abdomen is flat.      Palpations: Abdomen is soft.   Musculoskeletal:         General: Normal range of motion.      Cervical back: Normal range of motion.   Skin:     General: Skin is warm and dry.      Capillary Refill: Capillary refill takes less than 2 seconds.   Neurological:      General: No focal deficit present.      Mental Status: He is alert. Mental status is at baseline. He is disoriented.   Psychiatric:         Mood and Affect: Mood normal.         Behavior: Behavior normal.         Thought Content: Thought content normal.         Judgment: Judgment normal.         Last Recorded Vitals  Blood pressure 131/79, pulse 67, temperature 36.5 °C (97.7 °F), temperature source Temporal, resp. rate 20, height 1.828 m (5' 11.97\"), weight 87.4 kg (192 lb 10.9 oz), SpO2 97 %.  Intake/Output last 3 Shifts:  I/O last 3 completed shifts:  In: 1320 (15.1 mL/kg) [P.O.:1320]  Out: - (0 mL/kg)   Weight: 87.4 kg     Relevant " Results                      Assessment/Plan   Principal Problem:    Fall, initial encounter    A/P     1) frequent falls-  patient is homeless and would benefit from rehab     2) anorexia/severe protein calrorie malnutrition; food insecurity is an issue here.  Await PT and SW evaluation.  Patient is insured.       3) DMII- continue current management     4) Major depressive disorder.  Continue Wellbutrin.      Dustin Antonio MD

## 2023-10-23 VITALS
OXYGEN SATURATION: 96 % | DIASTOLIC BLOOD PRESSURE: 70 MMHG | BODY MASS INDEX: 26.1 KG/M2 | WEIGHT: 192.68 LBS | HEIGHT: 72 IN | RESPIRATION RATE: 18 BRPM | TEMPERATURE: 97.2 F | HEART RATE: 68 BPM | SYSTOLIC BLOOD PRESSURE: 121 MMHG

## 2023-10-23 LAB
GLUCOSE BLD MANUAL STRIP-MCNC: 125 MG/DL (ref 74–99)
GLUCOSE BLD MANUAL STRIP-MCNC: 184 MG/DL (ref 74–99)

## 2023-10-23 PROCEDURE — G0378 HOSPITAL OBSERVATION PER HR: HCPCS

## 2023-10-23 PROCEDURE — 99238 HOSP IP/OBS DSCHRG MGMT 30/<: CPT | Performed by: INTERNAL MEDICINE

## 2023-10-23 PROCEDURE — 82947 ASSAY GLUCOSE BLOOD QUANT: CPT | Mod: IPSPLIT

## 2023-10-23 PROCEDURE — 2500000001 HC RX 250 WO HCPCS SELF ADMINISTERED DRUGS (ALT 637 FOR MEDICARE OP): Mod: IPSPLIT | Performed by: INTERNAL MEDICINE

## 2023-10-23 RX ADMIN — INSULIN LISPRO 3 UNITS: 100 INJECTION, SOLUTION INTRAVENOUS; SUBCUTANEOUS at 12:08

## 2023-10-23 RX ADMIN — BUPROPION HYDROCHLORIDE 300 MG: 150 TABLET, FILM COATED, EXTENDED RELEASE ORAL at 08:33

## 2023-10-23 RX ADMIN — EMPAGLIFLOZIN 25 MG: 10 TABLET, FILM COATED ORAL at 08:33

## 2023-10-23 RX ADMIN — GABAPENTIN 200 MG: 100 CAPSULE ORAL at 05:42

## 2023-10-23 RX ADMIN — BUSPIRONE HYDROCHLORIDE 15 MG: 5 TABLET ORAL at 08:33

## 2023-10-23 RX ADMIN — ATORVASTATIN CALCIUM 40 MG: 40 TABLET, FILM COATED ORAL at 08:33

## 2023-10-23 ASSESSMENT — COGNITIVE AND FUNCTIONAL STATUS - GENERAL
MOBILITY SCORE: 24
DAILY ACTIVITIY SCORE: 24

## 2023-10-23 ASSESSMENT — ACTIVITIES OF DAILY LIVING (ADL): LACK_OF_TRANSPORTATION: YES

## 2023-10-23 ASSESSMENT — PAIN SCALES - GENERAL: PAINLEVEL_OUTOF10: 0 - NO PAIN
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
## 2023-10-23 NOTE — DISCHARGE SUMMARY
Discharge Diagnosis  Fall, initial encounter    Issues Requiring Follow-Up  none    Test Results Pending At Discharge  Pending Labs       No current pending labs.            Hospital Course   Physical Exam  Constitutional:       Appearance: Normal appearance.   HENT:      Head: Normocephalic.      Comments: Repaired laceration from fall over left eye     Nose: Nose normal.      Mouth/Throat:      Mouth: Mucous membranes are dry.   Eyes:      Extraocular Movements: Extraocular movements intact.      Pupils: Pupils are equal, round, and reactive to light.   Cardiovascular:      Rate and Rhythm: Bradycardia present. Rhythm irregular.      Pulses: Normal pulses.      Heart sounds: Normal heart sounds. No murmur heard.     No friction rub. No gallop.   Pulmonary:      Effort: No respiratory distress.      Breath sounds: Normal breath sounds. No stridor. No wheezing, rhonchi or rales.   Chest:      Chest wall: No tenderness.   Abdominal:      General: Abdomen is flat.      Palpations: Abdomen is soft.   Musculoskeletal:         General: Normal range of motion.      Cervical back: Normal range of motion.   Skin:     General: Skin is warm and dry.      Capillary Refill: Capillary refill takes less than 2 seconds.   Neurological:      General: No focal deficit present.      Mental Status: He is alert. Mental status is at baseline. He is disoriented.   Psychiatric:         Mood and Affect: Mood normal.         Behavior: Behavior normal.         Thought Content: Thought content normal.         Judgment: Judgment normal.         Home Medications     Medication List      CONTINUE taking these medications     atorvastatin 40 mg tablet; Commonly known as: Lipitor   * buPROPion  mg 24 hr tablet; Commonly known as: Wellbutrin XL   * buPROPion  mg 24 hr tablet; Commonly known as: Wellbutrin XL   busPIRone 15 mg tablet; Commonly known as: Buspar   desvenlafaxine 100 mg 24 hr tablet; Commonly known as: Pristiq   gabapentin  100 mg capsule; Commonly known as: Neurontin   Jardiance 25 mg; Generic drug: empagliflozin   mirtazapine 15 mg tablet; Commonly known as: Remeron  * This list has 2 medication(s) that are the same as other medications   prescribed for you. Read the directions carefully, and ask your doctor or   other care provider to review them with you.       Outpatient Follow-Up  A/P     1) frequent falls-  patient is homeless and would benefit from rehab; however, patient has an AMPAC of 24 and does not qualify.  He will be recommended to [facility] after discharge and taken via shuttle.     2) anorexia/severe protein calrorie malnutrition; food insecurity is an issue here.  Await PT and SW evaluation.  Patient is insured.       3) DMII- continue current management     4) Major depressive disorder.  Continue Wellbutrin.    Dustin Antonio MD

## 2023-10-23 NOTE — CARE PLAN
Patient had uneventful shift. VSS. Patient uses call bell for all patient needs. Tolerating ordered medications this shift. Patient discharged home. No needs at this time

## 2023-10-23 NOTE — CARE PLAN
The patient's goals for the shift include control pain    The clinical goals for the shift include patient will have no pain this shift    Patient had no complaints of pain this shift. Vitals wnl. Patient did not need any pain medication.

## 2023-11-08 ENCOUNTER — HOSPITAL ENCOUNTER (EMERGENCY)
Facility: HOSPITAL | Age: 63
Discharge: HOME | End: 2023-11-08
Attending: FAMILY MEDICINE
Payer: MEDICAID

## 2023-11-08 VITALS
RESPIRATION RATE: 16 BRPM | SYSTOLIC BLOOD PRESSURE: 122 MMHG | WEIGHT: 180 LBS | DIASTOLIC BLOOD PRESSURE: 78 MMHG | OXYGEN SATURATION: 99 % | TEMPERATURE: 97.2 F | HEART RATE: 88 BPM | HEIGHT: 72 IN | BODY MASS INDEX: 24.38 KG/M2

## 2023-11-08 DIAGNOSIS — R42 LIGHTHEADED: Primary | ICD-10-CM

## 2023-11-08 DIAGNOSIS — T50.905A ADVERSE EFFECT OF DRUG, INITIAL ENCOUNTER: ICD-10-CM

## 2023-11-08 LAB — GLUCOSE BLD MANUAL STRIP-MCNC: 179 MG/DL (ref 74–99)

## 2023-11-08 PROCEDURE — 99285 EMERGENCY DEPT VISIT HI MDM: CPT | Performed by: FAMILY MEDICINE

## 2023-11-08 PROCEDURE — 82947 ASSAY GLUCOSE BLOOD QUANT: CPT

## 2023-11-08 PROCEDURE — 99283 EMERGENCY DEPT VISIT LOW MDM: CPT

## 2023-11-08 ASSESSMENT — COLUMBIA-SUICIDE SEVERITY RATING SCALE - C-SSRS
1. IN THE PAST MONTH, HAVE YOU WISHED YOU WERE DEAD OR WISHED YOU COULD GO TO SLEEP AND NOT WAKE UP?: NO
2. HAVE YOU ACTUALLY HAD ANY THOUGHTS OF KILLING YOURSELF?: NO
6. HAVE YOU EVER DONE ANYTHING, STARTED TO DO ANYTHING, OR PREPARED TO DO ANYTHING TO END YOUR LIFE?: NO

## 2023-11-08 ASSESSMENT — PAIN - FUNCTIONAL ASSESSMENT: PAIN_FUNCTIONAL_ASSESSMENT: 0-10

## 2023-11-08 ASSESSMENT — PAIN SCALES - GENERAL: PAINLEVEL_OUTOF10: 0 - NO PAIN

## 2023-11-08 NOTE — ED TRIAGE NOTES
Pt ambulatory to ED c/o chronic dizziness.  Pt is somewhat difficult to understand, rambles and mumbling.  Pt presents to ED with a cane, several plastic bags of belongings, and per patient access sat in the lobby for 30 minutes prior to checking in to the ED.

## 2023-11-09 NOTE — ED PROVIDER NOTES
HPI   Chief Complaint   Patient presents with    Multiple Complaints       63-year-old male comes the ED with complaint of feeling lightheadedness intermittently for the past several days.  Patient reports that recently he had a new medication added for his sugar as well as an increase in his gabapentin for management of his pain.  Patient reports currently he otherwise feels fine and has no lightheadedness but wanted to come and get checked out nonetheless.  Patient reported no other associate symptoms.  Patient in the ED is alert, cooperative, appears anxious, comfortable, and in no distress.  Patient currently denies headache, blurred vision, neck pain, chest pain, back pain, abdominal pain, shortness of breath, fever/chills, falls or trauma, recent travel, sick contacts, nausea/vomiting/diarrhea, dysuria, hematuria, and weakness.      History provided by:  Patient   used: No                        No data recorded                Patient History   History reviewed. No pertinent past medical history.  History reviewed. No pertinent surgical history.  No family history on file.  Social History     Tobacco Use    Smoking status: Former     Types: Cigarettes    Smokeless tobacco: Former   Substance Use Topics    Alcohol use: Never    Drug use: Never       Physical Exam   ED Triage Vitals   Temp Heart Rate Resp BP   11/08/23 1626 11/08/23 1626 11/08/23 1626 11/08/23 1626   36.2 °C (97.2 °F) 99 18 126/81      SpO2 Temp src Heart Rate Source Patient Position   11/08/23 1626 -- 11/08/23 1738 --   97 %  Monitor       BP Location FiO2 (%)     -- --             Physical Exam  Vitals and nursing note reviewed.   Constitutional:       General: He is not in acute distress.     Appearance: He is well-developed.   HENT:      Head: Normocephalic and atraumatic.   Eyes:      Conjunctiva/sclera: Conjunctivae normal.   Cardiovascular:      Rate and Rhythm: Normal rate and regular rhythm.      Heart sounds: No  murmur heard.  Pulmonary:      Effort: Pulmonary effort is normal. No respiratory distress.      Breath sounds: Normal breath sounds.   Abdominal:      Palpations: Abdomen is soft.      Tenderness: There is no abdominal tenderness.   Musculoskeletal:         General: No swelling.      Cervical back: Neck supple.   Skin:     General: Skin is warm and dry.      Capillary Refill: Capillary refill takes less than 2 seconds.   Neurological:      General: No focal deficit present.      Mental Status: He is alert.      GCS: GCS eye subscore is 4. GCS verbal subscore is 5. GCS motor subscore is 6.      Cranial Nerves: Cranial nerves 2-12 are intact.      Sensory: Sensation is intact.      Motor: Motor function is intact.      Coordination: Coordination is intact.      Gait: Gait is intact.      Comments: Negative [place]/Hallpike maneuver  Normal orthostatics   Psychiatric:         Mood and Affect: Mood normal.         ED Course & MDM   Diagnoses as of 11/08/23 1947   Lightheaded   Adverse effect of drug, initial encounter     Labs Reviewed   POCT GLUCOSE - Abnormal       Result Value    POCT Glucose 179 (*)        1659 -- NSR rate 85.  Normal axis.  Normal intervals.  No ST-T changes or signs of ischemia.  Normal EKG with no findings of STEMI. Unchanged compared to old EKG    Medical Decision Making  Patient upon arrival to the ED appeared to be anxious but comfortable and in no distress stable vital signs.  Discussed with patient's presenting complaints and clinical findings.  Reviewed with patient his epic chart and counseled patient on lightheadedness and appropriate pressure management/treatments.  After assessment and evaluation patient's physical he was found to be within normal and at his baseline and patient had no complaints of lightheadedness during exam or monitoring in the ED.  EKG was performed and reviewed and POC glucose was done and found to be appropriate.  Further discussion was had with patient regarding his  home medications and recent medication changes and increase in dosage advised that he may want to consider discussing with his primary care doctor a reduced dose as it has been causing him some lightheadedness.  Patient also advised to continue to hydrate better and maintain better dietary changes in hopes of better glucose control to also aid with control of his lightheadedness.  Patient struck to get a count his primary care doctor follow-up recheck in several days and advised to return to ED if he feels he has recurrence of his lightheadedness for reevaluation assessment in the ED.  Patient stable and discharged.    Amount and/or Complexity of Data Reviewed  External Data Reviewed: labs, radiology, ECG and notes.  Labs: ordered. Decision-making details documented in ED Course.    Risk  OTC drugs.        Procedure  Procedures     Andrew Gonzalez MD  11/08/23 2001

## 2023-11-13 ENCOUNTER — APPOINTMENT (OUTPATIENT)
Dept: RADIOLOGY | Facility: HOSPITAL | Age: 63
End: 2023-11-13
Payer: MEDICAID

## 2023-11-13 ENCOUNTER — HOSPITAL ENCOUNTER (EMERGENCY)
Facility: HOSPITAL | Age: 63
Discharge: HOME | End: 2023-11-13
Attending: FAMILY MEDICINE
Payer: MEDICAID

## 2023-11-13 VITALS
SYSTOLIC BLOOD PRESSURE: 135 MMHG | DIASTOLIC BLOOD PRESSURE: 83 MMHG | OXYGEN SATURATION: 98 % | HEIGHT: 72 IN | WEIGHT: 180 LBS | RESPIRATION RATE: 18 BRPM | TEMPERATURE: 97.3 F | BODY MASS INDEX: 24.38 KG/M2 | HEART RATE: 89 BPM

## 2023-11-13 DIAGNOSIS — S62.101G CLOSED FRACTURE OF RIGHT WRIST WITH DELAYED HEALING, SUBSEQUENT ENCOUNTER: Primary | ICD-10-CM

## 2023-11-13 DIAGNOSIS — G44.029 CHRONIC CLUSTER HEADACHE, NOT INTRACTABLE: ICD-10-CM

## 2023-11-13 PROCEDURE — 70450 CT HEAD/BRAIN W/O DYE: CPT | Performed by: RADIOLOGY

## 2023-11-13 PROCEDURE — 73110 X-RAY EXAM OF WRIST: CPT | Mod: RIGHT SIDE | Performed by: RADIOLOGY

## 2023-11-13 PROCEDURE — 99284 EMERGENCY DEPT VISIT MOD MDM: CPT | Mod: 25

## 2023-11-13 PROCEDURE — 99285 EMERGENCY DEPT VISIT HI MDM: CPT | Mod: 25 | Performed by: FAMILY MEDICINE

## 2023-11-13 PROCEDURE — 73110 X-RAY EXAM OF WRIST: CPT | Mod: RT,FY

## 2023-11-13 PROCEDURE — 70450 CT HEAD/BRAIN W/O DYE: CPT

## 2023-11-13 ASSESSMENT — COLUMBIA-SUICIDE SEVERITY RATING SCALE - C-SSRS
2. HAVE YOU ACTUALLY HAD ANY THOUGHTS OF KILLING YOURSELF?: NO
1. IN THE PAST MONTH, HAVE YOU WISHED YOU WERE DEAD OR WISHED YOU COULD GO TO SLEEP AND NOT WAKE UP?: NO
6. HAVE YOU EVER DONE ANYTHING, STARTED TO DO ANYTHING, OR PREPARED TO DO ANYTHING TO END YOUR LIFE?: NO

## 2023-11-13 ASSESSMENT — PAIN SCALES - GENERAL: PAINLEVEL_OUTOF10: 10 - WORST POSSIBLE PAIN

## 2023-11-13 ASSESSMENT — PAIN DESCRIPTION - LOCATION: LOCATION: HEAD

## 2023-11-13 ASSESSMENT — PAIN - FUNCTIONAL ASSESSMENT: PAIN_FUNCTIONAL_ASSESSMENT: 0-10

## 2023-11-13 NOTE — ED PROVIDER NOTES
HPI   Chief Complaint   Patient presents with    Wrist Pain    Headache       HPI  Patient stated that he suffered a head contusion and laceration about a week and 1/2 x 2 to 3 weeks ago had CT of the head done he also sore continued right hand and right wrist.  Still complains of rashes pain and headache and decided come to ER for evaluation.  Denies loss of conscious blackout biceps chest pain or short of breath fever chills nausea vomiting or diarrhea.    Denies income stool or urine.    Family history: Reviewed  Social history: Reviewed, denies substance abuse.    Review of system: 10 review of system obtained, review of system negative except as described in HPI             Lueders Coma Scale Score: 15                  Patient History   Past Medical History:   Diagnosis Date    Diabetes mellitus (CMS/[facility])     Hypertension      History reviewed. No pertinent surgical history.  No family history on file.  Social History     Tobacco Use    Smoking status: Former     Types: Cigarettes    Smokeless tobacco: Former   Substance Use Topics    Alcohol use: Never    Drug use: Never       Physical Exam   ED Triage Vitals [11/13/23 1509]   Temp Heart Rate Resp BP   36.3 °C (97.3 °F) 89 18 135/83      SpO2 Temp src Heart Rate Source Patient Position   98 % -- -- --      BP Location FiO2 (%)     -- --       Physical Exam  Constitutional:       Appearance: Normal appearance.      Comments: Patient awake and alert pleasant cooperative did not look sick toxic distress somewhat anxious symptoms.  Recent injury to head noted otherwise no new evidence of injury to head and neck.  Cervical thoracic and lumbar spine nontender he was talking breathing comfortably.  Follow commands able to move arms and legs.  Complains of wrist discomfort though.   HENT:      Head: Normocephalic and atraumatic.      Comments: As discussed above recent head injury no new trauma fall injury noted.     Right Ear: External ear normal.      Left Ear:  External ear normal.      Nose: Nose normal. No congestion or rhinorrhea.   Eyes:      Extraocular Movements: Extraocular movements intact.      Conjunctiva/sclera: Conjunctivae normal.      Pupils: Pupils are equal, round, and reactive to light.   Neck:      Comments:   No facial bruise edema erythema noted.  Cervical thoracic and lumbar spine nontender.  Cardiovascular:      Rate and Rhythm: Normal rate and regular rhythm.      Pulses: Normal pulses.      Heart sounds: Normal heart sounds.      Comments: Heart regular rate and rhythm.  No friction rub no murmur no JVD good peripheral pulses no peripheral edema.  Calf is nontender Homans' sign negative intact distal pulse intact sensation.  Pulmonary:      Effort: Pulmonary effort is normal.      Breath sounds: Normal breath sounds.      Comments: Chest wall nontender no subcutaneous emphysema no flail chest.  Calf is nontender Homans' sign negative.  Breathing comfortably moving air well bilaterally no tachypnea or hypoxemia respite distress.  Abdominal:      General: Abdomen is flat. Bowel sounds are normal.      Palpations: Abdomen is soft.      Comments: Abdomen soft positive bowel sound nontender no guarding rebound surgery no CVA tenderness noted.   Musculoskeletal:      Cervical back: Normal range of motion and neck supple.      Comments: Recent head injury otherwise head was without evidence of new injury cervical thoracic and lumbar spine nontender.  Good motion shoulder elbow wrist bilaterally some discomfort of the right wrist noted no deformity with mild bruise.  Anatomic support nontender good handgrip intact radial pulses.  Good range of motion shoulder hip knee and ankle joint as well as   Skin:     General: Skin is warm.      Capillary Refill: Capillary refill takes less than 2 seconds.   Neurological:      General: No focal deficit present.      Mental Status: He is alert and oriented to person, place, and time.      Comments: Cervical thoracic and  "lumbar spine nontender.  No facial bruise edema edema cranial nerves II to XII grossly intact patient has good motion of the left symmetrical strength both upper lower extremities.  No motor or sensory deficit.  No nystagmus.   Psychiatric:         Mood and Affect: Mood normal.         Behavior: Behavior normal.         ED Course & MDM   Diagnoses as of 12/11/23 0352   Closed fracture of right wrist with delayed healing, subsequent encounter   Chronic cluster headache, not intractable       Medical Decision Making  Patient stated that he suffered a head contusion and laceration about a week and 1/2 x 2 to 3 weeks ago had CT of the head done he also sore continued right hand and right wrist.  Still complains of rashes pain and headache and decided come to ER for evaluation.  Denies loss of conscious blackout biceps chest pain or short of breath fever chills nausea vomiting or diarrhea.    Patient history of recent injury complain of a headache and left wrist pain, examination showed left wrist discomfort otherwise no new findings on the head and neck examination cervical thoracic and lumbar spine nontender chest wall nontender pelvis stable.  Lungs are clear heart regular rate and rhythm no murmurs auscultated abdomen soft and nontender.  Pelvis stable neuro examination grossly within normal range.    CT of the head showed no acute intracranial abnormality and no posttraumatic changes.  Left wrist x-ray shows old avulsion styloid fracture of the ulna and the \"fracture as described in x-ray report.    Patient fitted with a wrist splint referred to orthopedic surgeon and also primary care physician if symptoms persist follow with neurologist.  If any problem concern return to ER.  Discharged home in stable condition.      Procedure  Procedures     Amy Ríos MD  12/11/23 0359    "
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
## 2023-11-30 ENCOUNTER — HOSPITAL ENCOUNTER (OUTPATIENT)
Dept: CARDIOLOGY | Facility: HOSPITAL | Age: 63
Discharge: HOME | End: 2023-11-30
Payer: MEDICAID

## 2023-11-30 PROCEDURE — 93005 ELECTROCARDIOGRAM TRACING: CPT

## 2023-12-27 LAB
ATRIAL RATE: 85 BPM
P AXIS: 4 DEGREES
P OFFSET: 195 MS
P ONSET: 136 MS
PR INTERVAL: 156 MS
Q ONSET: 214 MS
QRS COUNT: 14 BEATS
QRS DURATION: 88 MS
QT INTERVAL: 378 MS
QTC CALCULATION(BAZETT): 449 MS
QTC FREDERICIA: 424 MS
R AXIS: -9 DEGREES
T AXIS: 25 DEGREES
T OFFSET: 403 MS
VENTRICULAR RATE: 85 BPM